# Patient Record
Sex: MALE | Race: WHITE | NOT HISPANIC OR LATINO | URBAN - METROPOLITAN AREA
[De-identification: names, ages, dates, MRNs, and addresses within clinical notes are randomized per-mention and may not be internally consistent; named-entity substitution may affect disease eponyms.]

---

## 2022-07-26 ENCOUNTER — OFFICE (AMBULATORY)
Dept: URBAN - METROPOLITAN AREA CLINIC 64 | Facility: CLINIC | Age: 87
End: 2022-07-26

## 2022-07-26 VITALS
DIASTOLIC BLOOD PRESSURE: 78 MMHG | HEART RATE: 61 BPM | HEIGHT: 68 IN | WEIGHT: 155 LBS | SYSTOLIC BLOOD PRESSURE: 141 MMHG

## 2022-07-26 DIAGNOSIS — K92.1 MELENA: ICD-10-CM

## 2022-07-26 DIAGNOSIS — K64.1 SECOND DEGREE HEMORRHOIDS: ICD-10-CM

## 2022-07-26 DIAGNOSIS — R15.9 FULL INCONTINENCE OF FECES: ICD-10-CM

## 2022-07-26 DIAGNOSIS — R19.7 DIARRHEA, UNSPECIFIED: ICD-10-CM

## 2022-07-26 PROCEDURE — 99204 OFFICE O/P NEW MOD 45 MIN: CPT | Mod: 25 | Performed by: INTERNAL MEDICINE

## 2022-07-26 PROCEDURE — 46600 DIAGNOSTIC ANOSCOPY SPX: CPT | Performed by: INTERNAL MEDICINE

## 2022-08-09 ENCOUNTER — OFFICE (AMBULATORY)
Dept: URBAN - METROPOLITAN AREA CLINIC 64 | Facility: CLINIC | Age: 87
End: 2022-08-09

## 2022-08-09 VITALS — HEIGHT: 68 IN

## 2022-08-09 DIAGNOSIS — K64.2 THIRD DEGREE HEMORRHOIDS: ICD-10-CM

## 2022-08-09 PROCEDURE — 46221 LIGATION OF HEMORRHOID(S): CPT | Performed by: INTERNAL MEDICINE

## 2022-08-09 NOTE — SERVICEHPINOTES
BREE GUERRA is being seen today at the request of ITA TALLEY for symptomatic hemorrhoids. He is Chandler Guerra's grandfather.  He complains of dark stools, change in bowel habits, diarrhea, mucous in stools, painful stools, rectal protrusions, soiling/incontinence.  This morning he had a large bowel movement and was dripping blood afterwards.  This occurs every few weeks if he has excessive stooling or bleeding.  He uses preparation H.  He has to wear a pad for soiling his underwear.  He had a stent placed 12/2021 for CAD and is s/p CABG on Plavix.  He has done well since.  He had radiation to the prostate about 10 years ago.  7/2015 EGD/colonoscopy was unremarkable except for diverticulosis and grade 2 internal hemorrhoids.yanira

## 2022-08-30 ENCOUNTER — OFFICE (AMBULATORY)
Dept: URBAN - METROPOLITAN AREA CLINIC 64 | Facility: CLINIC | Age: 87
End: 2022-08-30

## 2022-08-30 VITALS — HEIGHT: 68 IN

## 2022-08-30 DIAGNOSIS — K64.2 THIRD DEGREE HEMORRHOIDS: ICD-10-CM

## 2022-08-30 PROCEDURE — 46221 LIGATION OF HEMORRHOID(S): CPT | Performed by: INTERNAL MEDICINE

## 2022-08-30 NOTE — SERVICEHPINOTES
BREE GUERRA is being seen today for symptomatic hemorrhoids. He is Chandler Guerra's grandfather.  He complains of dark stools, change in bowel habits, diarrhea, mucous in stools, painful stools, rectal protrusions, soiling/incontinence.   He had a stent placed 12/2021 for CAD and is s/p CABG on Plavix.  He had radiation to the prostate about 10 years ago.  7/2015 EGD/colonoscopy was unremarkable except for diverticulosis and grade 2 internal hemorrhoids.yanira

## 2022-09-22 ENCOUNTER — OFFICE (AMBULATORY)
Dept: URBAN - METROPOLITAN AREA CLINIC 64 | Facility: CLINIC | Age: 87
End: 2022-09-22

## 2022-09-22 VITALS — HEIGHT: 68 IN

## 2022-09-22 DIAGNOSIS — K64.2 THIRD DEGREE HEMORRHOIDS: ICD-10-CM

## 2022-09-22 PROCEDURE — 46221 LIGATION OF HEMORRHOID(S): CPT | Performed by: INTERNAL MEDICINE
